# Patient Record
Sex: MALE
[De-identification: names, ages, dates, MRNs, and addresses within clinical notes are randomized per-mention and may not be internally consistent; named-entity substitution may affect disease eponyms.]

---

## 2018-04-04 ENCOUNTER — TRANSCRIPTION ENCOUNTER (OUTPATIENT)
Age: 28
End: 2018-04-04

## 2018-04-16 ENCOUNTER — TRANSCRIPTION ENCOUNTER (OUTPATIENT)
Age: 28
End: 2018-04-16

## 2021-05-07 PROBLEM — Z00.00 ENCOUNTER FOR PREVENTIVE HEALTH EXAMINATION: Status: ACTIVE | Noted: 2021-05-07

## 2021-05-10 ENCOUNTER — APPOINTMENT (OUTPATIENT)
Dept: ORTHOPEDIC SURGERY | Facility: CLINIC | Age: 31
End: 2021-05-10
Payer: COMMERCIAL

## 2021-05-10 VITALS
DIASTOLIC BLOOD PRESSURE: 84 MMHG | HEIGHT: 69 IN | BODY MASS INDEX: 28.88 KG/M2 | HEART RATE: 68 BPM | SYSTOLIC BLOOD PRESSURE: 138 MMHG | WEIGHT: 195 LBS

## 2021-05-10 DIAGNOSIS — Z87.81 PERSONAL HISTORY OF (HEALED) TRAUMATIC FRACTURE: ICD-10-CM

## 2021-05-10 DIAGNOSIS — G56.21 LESION OF ULNAR NERVE, RIGHT UPPER LIMB: ICD-10-CM

## 2021-05-10 DIAGNOSIS — S43.51XA SPRAIN OF RIGHT ACROMIOCLAVICULAR JOINT, INITIAL ENCOUNTER: ICD-10-CM

## 2021-05-10 DIAGNOSIS — M25.521 PAIN IN RIGHT ELBOW: ICD-10-CM

## 2021-05-10 DIAGNOSIS — M24.021 LOOSE BODY IN RIGHT ELBOW: ICD-10-CM

## 2021-05-10 DIAGNOSIS — M25.511 PAIN IN RIGHT SHOULDER: ICD-10-CM

## 2021-05-10 PROCEDURE — 99072 ADDL SUPL MATRL&STAF TM PHE: CPT

## 2021-05-10 PROCEDURE — 73080 X-RAY EXAM OF ELBOW: CPT | Mod: RT

## 2021-05-10 PROCEDURE — 73030 X-RAY EXAM OF SHOULDER: CPT | Mod: RT

## 2021-05-10 PROCEDURE — 99204 OFFICE O/P NEW MOD 45 MIN: CPT

## 2021-05-10 RX ORDER — METHYLPREDNISOLONE 4 MG/1
4 TABLET ORAL
Qty: 1 | Refills: 0 | Status: COMPLETED | COMMUNITY
Start: 2021-05-10 | End: 2021-05-16

## 2021-05-10 NOTE — HISTORY OF PRESENT ILLNESS
[___ yrs] : [unfilled] year(s) ago [3] : an average pain level of 3/10 [Lifting] : worsened by lifting [de-identified] : JAMES ALFONSO is a 31 year male being seen for initial visit R shoulder and elbow pain. SHOULDER: He reports R shoulder pain for a couple of months, no specific KAREEN. He endorses overall laxity of R shoulder. He reports he has been doing band work to increase the strength of his shoulder. He reports most pain with pushing motions. He endorses pain over the R trap and supraspinatus. He endorses numbness and tingling to the fingers. ELBOW: He reports elbow pain for 10 years. He reports R elbow ulna f/x and dislocation 10 years ago. He endorses swelling over R biceps tendon. He endorses pain over biceps tendon and ulnar sided pain. He reports most pain with pushing. He endorses numbness and tingling to the fingers. He reports occasionally taking advil for pain control.

## 2021-05-10 NOTE — ADDENDUM
[FreeTextEntry1] : Documented by Lester Page acting as a scribe for Dr. Maldonado on 05/10/2021. \par \par All medical record entries made by the Scribe were at my, Dr. Maldonado's, direction and\par personally dictated by me on 05/10/2021. I have reviewed the chart and agree that the record\par accurately reflects my personal performance of the history, physical exam, procedure and imaging.

## 2021-05-10 NOTE — DISCUSSION/SUMMARY
[de-identified] : JAMES ALFONSO is a 31 year male being seen for initial visit R shoulder and elbow pain secondary to lateral epicondylitis. SHOULDER: He reports R shoulder pain for a couple of months, no specific KAREEN. He endorses overall laxity of R shoulder. He reports he has been doing band work to increase the strength of his shoulder. He reports most pain with pushing motions. He endorses pain over the R trap and supraspinatus. He endorses numbness and tingling to the fingers. ELBOW: He reports elbow pain for 10 years. He reports R elbow ulna f/x and dislocation 10 years ago. He endorses swelling over R biceps tendon for which he tried conservative treatments. He endorses pain over biceps tendon and ulnar sided pain. He reports most pain with pushing. He endorses numbness and tingling to the fingers. He reports occasionally taking advil for pain control. \par \par We had a thorough discussion regarding the nature of his pain, the pathophysiology, as well as all treatment options. I discussed with the patient the treatment of lateral epicondylitis, arthritis, and loose body formations. I discussed that this is a degenerative condition rather than an inflammatory process. The process is reversible, and the best source of treatment is to reduce the offending repetitive overuse injury process. I counseled the patient how to do this. In addition, treatment includes counterforce bracing, physical therapy for stretching and strengthening, and the use of injection therapy (steroids/PRP etc). I also discussed the role of surgical intervention for may become a chronic condition.\par \par At this time he elected for conservative measures with elbow compression band, formal physical therapy and a prescription for meloxicam as directed and I counseled the patient on the side effects of meloxicam and the possible negative sequelae. If he does develop any acid reflux or abdominal pain they should stop the medication and then call us. He was also given a prescription for PT that he will perform 2x/week for 6-8 weeks. Considering the patient's current presentation of pain, physical exam, and radiographs an CT scan is indicated at this time. A prescription for this was given to the patient. We will go over the CT scan results with him upon obtaining the results in the office and advise him of further treatment options. He agrees with the above plan and all questions were answered. \par \par \par \par

## 2021-05-10 NOTE — PHYSICAL EXAM
[de-identified] : Physical Exam:\par General: Well appearing, no acute distress\par Neurologic: A&Ox3, No focal deficits\par Head: NCAT without abrasions, lacerations, or ecchymosis to head, face, or scalp\par Eyes: No scleral icterus, no gross abnormalities\par Respiratory: Equal chest wall expansion bilaterally, no accessory muscle use\par Lymphatic: No lymphadenopathy palpated\par Skin: Warm and dry\par Psychiatric: Normal mood and affect\par \par Right Shoulder\par ·	Inspection/Palpation: no tenderness, swelling or deformities\par ·	Range of Motion: no crepitus with ROM; Active FF 0 - 130; ER at side 0 - 25; IR to back pocket;\par ·	Strength: forward elevation in scapular plane 4/5, internal rotation 4/5, external rotation 4/5, adduction 4/5 and abduction 4/5\par ·	Stability: no joint instability on provocative testing\par ·	Tests: Collier test negative, Neer sign negative, negative drop arm test secondary to pain, bear hug test negative, Napolean sign negative, cross arm adduction negative, lift off sign positive, hornblowers sign negative, speeds test negative, Yergason's test negative, no bicipital groove tenderness, Rodriguez's Active Compression test negative \par \par Left Shoulder\par ·	Inspection/Palpation: no tenderness, swelling or deformities\par ·	Range of Motion: full and painless in all planes, no crepitus\par ·	Strength: forward elevation in scapular plane 5/5, internal rotation 5/5, external rotation 5/5, adduction 5/5 and abduction 5/5\par ·	Stability: no joint instability on provocative testing\par ·	Tests: Collier test negative, Neer sign negative, negative drop arm test secondary to pain, bear hug test negative, Napolean sign negative, cross arm adduction negative, lift off sign positive, hornblowers sign negative, speeds test negative, Yergason's test negative, no bicipital groove tenderness, Rodriguez's Active Compression test negative \par \par Right Elbow Exam\par \par Skin: Clean, dry, intact. No ecchymosis. No swelling. No palpable joint effusion.\par ROM: RIGHT 0-120, full supination/pronation.  LEFT 0-135, full supination/pronation, 15 degree hyperextension L elbow. +3 extension. \par Painful ROM: Pronation to lateral left elbow\par Tenderness:  medial epicondyle pain. Lateral epicondyle pain. No olecranon pain. No pain at radial head. Able to hook biceps\par Strength: 5/5 elbow flexion, 5/5 elbow extension, 5/5 supination, 5/5 pronation\par Stability: Stable to vaus/valgus stress\par Vasc: 2+ radial pulse, <2s cap refill\par Sensation: In tact to light touch throughout\par Neuro: Negative tinels at ulnar canal, AIN/PIN/Ulnar nerve in tact to motor/sensation.\par \par Special Tests:\par Dorsiflexion Against Resistance: POS\par Flexion of Wrist Against Resistance: Negative\par Resistance Against Pronation: Negative\par Resistance Against Supination: Negative\par Tinel's Sign Cubital Tunnel: Negative [de-identified] : 4 views of R shoulder were performed today and available for me to review. Results were discussed with the patient. They demonstrate old AC joint separation otherwise, no f/x, dislocation or other deformity.\par \par 4 views of elbow were performed today and available for me to review. Results were discussed with the patient. They demonstrate mild joint space narrowing at Radio-ulna, loose bodies noted, osteophyte formation noted, otherwise no f/x, dislocation or other deformity. \par

## 2021-05-25 ENCOUNTER — APPOINTMENT (OUTPATIENT)
Dept: CT IMAGING | Facility: CLINIC | Age: 31
End: 2021-05-25

## 2022-12-17 ENCOUNTER — NON-APPOINTMENT (OUTPATIENT)
Age: 32
End: 2022-12-17

## 2023-06-02 ENCOUNTER — NON-APPOINTMENT (OUTPATIENT)
Age: 33
End: 2023-06-02